# Patient Record
Sex: MALE
[De-identification: names, ages, dates, MRNs, and addresses within clinical notes are randomized per-mention and may not be internally consistent; named-entity substitution may affect disease eponyms.]

---

## 2020-01-25 ENCOUNTER — HOSPITAL ENCOUNTER (EMERGENCY)
Dept: HOSPITAL 57 - BURERS | Age: 25
Discharge: HOME | End: 2020-01-25
Payer: COMMERCIAL

## 2020-01-25 DIAGNOSIS — S61.031A: Primary | ICD-10-CM

## 2020-01-25 DIAGNOSIS — F43.10: ICD-10-CM

## 2020-01-25 DIAGNOSIS — Z77.21: ICD-10-CM
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
## 2020-01-25 DIAGNOSIS — Z79.899: ICD-10-CM

## 2020-01-25 PROCEDURE — 86803 HEPATITIS C AB TEST: CPT

## 2020-01-25 PROCEDURE — 86706 HEP B SURFACE ANTIBODY: CPT

## 2020-01-25 PROCEDURE — 87389 HIV-1 AG W/HIV-1&-2 AB AG IA: CPT

## 2020-01-25 PROCEDURE — 99283 EMERGENCY DEPT VISIT LOW MDM: CPT

## 2020-01-25 PROCEDURE — 36415 COLL VENOUS BLD VENIPUNCTURE: CPT

## 2020-01-26 LAB
HBSAB CONCENTRATION: 17.29 MIU/ML
HEP C INDEX: 0.24 S/CO (ref 0–0.79)
HIV 1/2 INDEX: 0.08 S/CO (ref ?–1)

## 2020-05-11 ENCOUNTER — HOSPITAL ENCOUNTER (EMERGENCY)
Dept: HOSPITAL 57 - BURERS | Age: 25
Discharge: HOME | End: 2020-05-11
Payer: COMMERCIAL

## 2020-05-11 DIAGNOSIS — F43.10: ICD-10-CM

## 2020-05-11 DIAGNOSIS — Z79.899: ICD-10-CM

## 2020-05-11 DIAGNOSIS — S93.401A: Primary | ICD-10-CM
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
## 2020-05-11 NOTE — RAD
RIGHT ANKLE 3 VIEWS:

 

DATE: 5/11/2020.

 

FINDINGS: 

Lateral soft tissue swelling is present.  No acute fracture was identified.  A bony ossicle near the 
tip of the medial malleolus is likely to be old.  A small white line seen along the tibial plafond on
 the oblique view is probably not acute.

 

IMPRESSION: 

Lateral soft tissue swelling.

 

POS: HOME

## 2020-06-12 ENCOUNTER — HOSPITAL ENCOUNTER (EMERGENCY)
Dept: HOSPITAL 57 - BURERS | Age: 25
Discharge: HOME | End: 2020-06-12
Payer: COMMERCIAL

## 2020-06-12 DIAGNOSIS — Z79.899: ICD-10-CM

## 2020-06-12 DIAGNOSIS — F43.10: ICD-10-CM
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
## 2020-06-12 DIAGNOSIS — S83.91XA: ICD-10-CM

## 2020-06-12 DIAGNOSIS — S93.402A: Primary | ICD-10-CM

## 2020-06-12 PROCEDURE — 99281 EMR DPT VST MAYX REQ PHY/QHP: CPT

## 2020-11-23 ENCOUNTER — HOSPITAL ENCOUNTER (OUTPATIENT)
Dept: HOSPITAL 92 - BICMRI | Age: 25
Discharge: HOME | End: 2020-11-23
Attending: FAMILY MEDICINE
Payer: COMMERCIAL

## 2020-11-23 DIAGNOSIS — M25.572: ICD-10-CM

## 2020-11-23 DIAGNOSIS — M65.872: ICD-10-CM

## 2020-11-23 DIAGNOSIS — S93.422D: Primary | ICD-10-CM

## 2020-11-23 PROCEDURE — 70210 X-RAY EXAM OF SINUSES: CPT

## 2020-11-23 NOTE — RAD
SINUSES OCONNELL VIEW ONLY:

 

DATE: 11/23/2020

 

HISTORY:  

MRI clearance. 

 

FINDINGS/IMPRESSION: 

No radiopaque foreign body is seen. 

 

 

POS: OFF

## 2020-11-23 NOTE — MRI
MRI OF LEFT FOOT PERFORMED WITHOUT CONTRAST ENHANCEMENT:

 

History: Lateral and medial foot pain. [place] a pop while restraining someone at work. 

 

FINDINGS: 

The Achilles tendon is normal in appearance. 

 

Anterior extensor tendon group is normal in appearance. 

 

There is some trace tenosynovitis changes of the posterior tibialis tendon. The flexor digitorum long
us tendon is intact. There are also tenosynovitis changes of the flexor hallucis longus tendon, more 
fluid than would be expected for the small amount of joint effusion within the ankle. 

 

Sinus tarsi region is normal. Plantar fascia is intact. 

 

No osteochondral lesion of the talar dome. Ligaments of the ankle joint appear intact. 

 

Subtalar joint is normal. 

 

IMPRESSION: 

1.      Tenosynovitis changes of the flexor hallucis longus tendon. 

2.      No evidence of ankle joint injury. 

3.      Intact appearing peroneus longus and brevis tendons. 

 

POS: AH